# Patient Record
Sex: FEMALE | Race: WHITE | NOT HISPANIC OR LATINO | Employment: FULL TIME | URBAN - METROPOLITAN AREA
[De-identification: names, ages, dates, MRNs, and addresses within clinical notes are randomized per-mention and may not be internally consistent; named-entity substitution may affect disease eponyms.]

---

## 2019-07-18 ENCOUNTER — TRANSCRIBE ORDERS (OUTPATIENT)
Dept: ADMINISTRATIVE | Facility: HOSPITAL | Age: 44
End: 2019-07-18

## 2019-07-18 DIAGNOSIS — Z12.31 VISIT FOR SCREENING MAMMOGRAM: Primary | ICD-10-CM

## 2019-08-27 ENCOUNTER — APPOINTMENT (OUTPATIENT)
Dept: MAMMOGRAPHY | Facility: HOSPITAL | Age: 44
End: 2019-08-27

## 2019-10-08 ENCOUNTER — HOSPITAL ENCOUNTER (OUTPATIENT)
Dept: MAMMOGRAPHY | Facility: HOSPITAL | Age: 44
Discharge: HOME | End: 2019-10-08
Admitting: OBSTETRICS & GYNECOLOGY

## 2019-10-08 DIAGNOSIS — Z12.31 VISIT FOR SCREENING MAMMOGRAM: ICD-10-CM

## 2019-10-08 PROCEDURE — 77063 BREAST TOMOSYNTHESIS BI: CPT

## 2019-10-08 PROCEDURE — 77067 SCR MAMMO BI INCL CAD: CPT | Performed by: RADIOLOGY

## 2019-10-08 PROCEDURE — 77067 SCR MAMMO BI INCL CAD: CPT

## 2019-10-08 PROCEDURE — 77063 BREAST TOMOSYNTHESIS BI: CPT | Performed by: RADIOLOGY

## 2019-11-12 ENCOUNTER — APPOINTMENT (OUTPATIENT)
Dept: MAMMOGRAPHY | Facility: HOSPITAL | Age: 44
End: 2019-11-12

## 2019-12-30 ENCOUNTER — HOSPITAL ENCOUNTER (OUTPATIENT)
Dept: MAMMOGRAPHY | Facility: HOSPITAL | Age: 44
Discharge: HOME | End: 2019-12-30
Admitting: RADIOLOGY

## 2019-12-30 ENCOUNTER — HOSPITAL ENCOUNTER (OUTPATIENT)
Dept: ULTRASOUND IMAGING | Facility: HOSPITAL | Age: 44
Discharge: HOME | End: 2019-12-30

## 2019-12-30 DIAGNOSIS — R92.8 ABNORMAL MAMMOGRAM: ICD-10-CM

## 2019-12-30 PROCEDURE — 77061 BREAST TOMOSYNTHESIS UNI: CPT | Performed by: RADIOLOGY

## 2019-12-30 PROCEDURE — 77065 DX MAMMO INCL CAD UNI: CPT | Performed by: RADIOLOGY

## 2019-12-30 PROCEDURE — 76642 ULTRASOUND BREAST LIMITED: CPT

## 2019-12-30 PROCEDURE — G0279 TOMOSYNTHESIS, MAMMO: HCPCS

## 2019-12-30 PROCEDURE — 77065 DX MAMMO INCL CAD UNI: CPT

## 2019-12-30 PROCEDURE — 76642 ULTRASOUND BREAST LIMITED: CPT | Performed by: RADIOLOGY

## 2024-02-27 ENCOUNTER — OFFICE VISIT (OUTPATIENT)
Dept: OBSTETRICS AND GYNECOLOGY | Facility: CLINIC | Age: 49
End: 2024-02-27
Payer: COMMERCIAL

## 2024-02-27 VITALS
SYSTOLIC BLOOD PRESSURE: 126 MMHG | BODY MASS INDEX: 32.74 KG/M2 | WEIGHT: 216 LBS | HEIGHT: 68 IN | DIASTOLIC BLOOD PRESSURE: 84 MMHG | RESPIRATION RATE: 18 BRPM

## 2024-02-27 DIAGNOSIS — Z01.419 PAP TEST, AS PART OF ROUTINE GYNECOLOGICAL EXAMINATION: Primary | ICD-10-CM

## 2024-02-27 RX ORDER — NORETHINDRONE ACETATE AND ETHINYL ESTRADIOL .02; 1 MG/1; MG/1
1 TABLET ORAL DAILY
Qty: 84 TABLET | Refills: 3 | Status: SHIPPED | OUTPATIENT
Start: 2024-02-27

## 2024-02-27 NOTE — PROGRESS NOTES
Gynecologic Annual Exam Note          GYN Annual Exam     Gynecologic Exam        Subjective     HPI  Shaylee Nicholas is a 48 y.o. female, , who presents for annual well woman exam as a previous patient not seen in the last three years. There were no changes to her medical or surgical history since her last visit..  Patient's last menstrual period was 2023 (within days).  Her periods occur every 2-3 months, lasting 10  days.  The flow is spotting/light. She denies dysmenorrhea. Marital Status: . She is sexually active. She has not had new partners.. STD testing recommendations have been explained to the patient and she declines STD testing.    The patient would like to discuss the following complaints today: patient would like birth control.    Additional OB/GYN History   contraceptive methods: Withdrawal   Desires to: start contraception  History of migraines: no    Last Pap : over 3 years ago. Result: negative. HPV: unknown .   Last Completed Pap Smear       This patient has no relevant Health Maintenance data.          History of abnormal Pap smear: no  Family history of uterine, colon, breast, or ovarian cancer: yes - mother had ovarian cancer  Performs monthly Self-Breast Exam: yes  Last mammogram: over 3 years ago.    Last Completed Mammogram       This patient has no relevant Health Maintenance data.            Colonoscopy: has had a colonoscopy 8 years ago  Exercises Regularly: no  Feelings of Anxiety or Depression: no  Tobacco Usage?: No       Current Outpatient Medications:   •  norethindrone-ethinyl estradiol (Loestrin , ,) 1-20 MG-MCG per tablet, Take 1 tablet by mouth Daily., Disp: 84 tablet, Rfl: 3     Patient denies the need for medication refills today.    OB History          2    Para   2    Term   2            AB        Living   2         SAB        IAB        Ectopic        Molar        Multiple        Live Births   2                No past medical  "history on file.     Past Surgical History:   Procedure Laterality Date   • WISDOM TOOTH EXTRACTION      1994       Health Maintenance   Topic Date Due   • Annual Gynecologic Pelvic and Breast Exam  Never done   • BMI FOLLOWUP  Never done   • COLORECTAL CANCER SCREENING  Never done   • TDAP/TD VACCINES (1 - Tdap) Never done   • INFLUENZA VACCINE  Never done   • COVID-19 Vaccine (3 - 2023-24 season) 09/01/2023   • HEPATITIS C SCREENING  Never done   • ANNUAL PHYSICAL  Never done   • PAP SMEAR  Never done   • Pneumococcal Vaccine 0-64  Aged Out       The additional following portions of the patient's history were reviewed and updated as appropriate: allergies and current medications.    Review of Systems      I have reviewed and agree with the HPI, ROS, and historical information as entered above. Frank De La Garza MD          Objective   /84   Resp 18   Ht 172.7 cm (68\")   Wt 98 kg (216 lb)   LMP 11/27/2023 (Within Days)   BMI 32.84 kg/m²     Physical Exam  Vitals and nursing note reviewed. Exam conducted with a chaperone present.   Constitutional:       Appearance: She is well-developed.   HENT:      Head: Normocephalic and atraumatic.   Neck:      Thyroid: No thyroid mass or thyromegaly.   Cardiovascular:      Rate and Rhythm: Normal rate and regular rhythm.      Heart sounds: No murmur heard.  Pulmonary:      Effort: Pulmonary effort is normal. No retractions.      Breath sounds: Normal breath sounds. No wheezing, rhonchi or rales.   Chest:      Chest wall: No mass or tenderness.   Breasts:     Right: Normal. No mass, nipple discharge, skin change or tenderness.      Left: Normal. No mass, nipple discharge, skin change or tenderness.   Abdominal:      General: Bowel sounds are normal.      Palpations: Abdomen is soft. Abdomen is not rigid. There is no mass.      Tenderness: There is no abdominal tenderness. There is no guarding.      Hernia: No hernia is present. There is no hernia in the left inguinal " area.   Genitourinary:     Labia:         Right: No rash, tenderness or lesion.         Left: No rash, tenderness or lesion.       Vagina: Normal. No vaginal discharge or lesions.      Cervix: No cervical motion tenderness, discharge, lesion or cervical bleeding.      Uterus: Normal. Not enlarged, not fixed and not tender.       Adnexa:         Right: No mass or tenderness.          Left: No mass or tenderness.        Rectum: No external hemorrhoid.   Musculoskeletal:      Cervical back: Normal range of motion. No muscular tenderness.   Neurological:      Mental Status: She is alert and oriented to person, place, and time.   Psychiatric:         Behavior: Behavior normal.            Assessment and Plan    Problem List Items Addressed This Visit    None  Visit Diagnoses       Pap test, as part of routine gynecological examination    -  Primary    Relevant Medications    norethindrone-ethinyl estradiol (Loestrin 1/20, 21,) 1-20 MG-MCG per tablet    Other Relevant Orders    LIQUID-BASED PAP SMEAR WITH HPV GENOTYPING REGARDLESS OF INTERPRETATION ([facility],MAD)    Mammo Screening Digital Tomosynthesis Bilateral With CAD    Ambulatory Referral to Colorectal Surgery (Completed)            GYN annual well woman exam.   Pap guidelines reviewed.  OCP's/Vaginal Ring - Discussed side effects of nausea, BTB, headaches, breast tenderness and slight weight gain in the first three cycles.  Understands risks of blood clots, stroke, and theoretical risk of breast cancer.  Denies family history of blood clots.  Reviewed risks/benefits of hormonal contraception after age 35, including possible increased risk of breast cancer, heart disease, blood clots and strokes.  Patient strongly desires to stay on hormonal contraception.  Reviewed monthly self breast exams.  Instructed to call with lumps, pain, or breast discharge.    Ordered Mammogram today  Recommended use of Vitamin D replacement and getting adequate calcium in her diet.  (1500mg)  Reviewed BMI and weight loss as preventative health measures.   Reviewed exercise as a preventative health measures.   RTC in 1 year or PRN with problems.  Discussed importance of routine colon cancer screening. Reviewed current guidelines, for patients at average risk for colon cancer, discussed cologuard as an acceptable alternative.   No follow-ups on file.     Frank De La Garza MD  02/27/2024

## 2024-03-01 ENCOUNTER — TELEPHONE (OUTPATIENT)
Dept: OBSTETRICS AND GYNECOLOGY | Facility: CLINIC | Age: 49
End: 2024-03-01
Payer: COMMERCIAL

## 2024-03-01 DIAGNOSIS — Z12.31 BREAST CANCER SCREENING BY MAMMOGRAM: ICD-10-CM

## 2024-03-01 DIAGNOSIS — R92.8 ABNORMAL MAMMOGRAM: Primary | ICD-10-CM

## 2024-03-01 NOTE — TELEPHONE ENCOUNTER
Screening mammogram routed back: BASED ON LAST MAMMOGRAM, PATIENT WILL NEED A DIAGNOSTIC MAMMOGRAM INSTEAD OF SCREENING.     Please place new order for diagnostic mammogram

## 2024-03-04 LAB — REF LAB TEST METHOD: NORMAL

## 2024-09-09 ENCOUNTER — OFFICE VISIT (OUTPATIENT)
Dept: OBSTETRICS AND GYNECOLOGY | Facility: CLINIC | Age: 49
End: 2024-09-09
Payer: COMMERCIAL

## 2024-09-09 VITALS — BODY MASS INDEX: 31.02 KG/M2 | DIASTOLIC BLOOD PRESSURE: 84 MMHG | SYSTOLIC BLOOD PRESSURE: 122 MMHG | WEIGHT: 204 LBS

## 2024-09-09 DIAGNOSIS — D25.0 SUBMUCOUS LEIOMYOMA OF UTERUS: ICD-10-CM

## 2024-09-09 DIAGNOSIS — N92.0 MENORRHAGIA WITH REGULAR CYCLE: Primary | ICD-10-CM

## 2024-09-09 PROCEDURE — 99213 OFFICE O/P EST LOW 20 MIN: CPT | Performed by: NURSE PRACTITIONER

## 2024-09-09 RX ORDER — NORETHINDRONE ACETATE AND ETHINYL ESTRADIOL .03; 1.5 MG/1; MG/1
1 TABLET ORAL DAILY
Qty: 84 EACH | Refills: 3 | Status: SHIPPED | OUTPATIENT
Start: 2024-09-09

## 2024-09-09 NOTE — PROGRESS NOTES
Chief Complaint   Patient presents with    Menorrhagia         Subjective   HPI  Shaylee Nicholas is a 49 y.o. female, , Patient's last menstrual period was 2024 (exact date).. She presents for evaluation of menorrhagia with regular cycles. She saturates 1 tampon(s)/pad(s) every 2 hours for 5 days.  On her heaviest days she was passing clots. She was prescribed ocp in February of this year for heavy bleeding. She took this for three months but did not like she felt on it. She reports  and July her periods were better, but by August she had started the heavy bleeding again. She reports she has been under more stress than usual. She went to PCP and was told she is anemic, she has been lethargic, heart palpitations, and lightheaded. She has been on B12 and Iron supplements for 2 weeks now.  In the past the patient has tried birth control pills/Nuvaring in February of this year (3 months) for treatment without success. The patient reports additional symptoms as none.      US was done today. Fibroids. 1) 26.6 x 20.7 x 20.8 mm submucosal vs intramural. 2) 38.3 x 25.1 x 28.4 mm, submucosal. Unable to clearly visualize endometrial borders due to fibroids. Normal ovaries.    Thromboembolic Disease: none  History of hypertension: no  History of migraines: no  Tobacco Usage?: No     Additional OB/GYN History   Last Pap :   Last Completed Pap Smear            PAP SMEAR (Every 3 Years) Next due on 2024  LIQUID-BASED PAP SMEAR WITH HPV GENOTYPING REGARDLESS OF INTERPRETATION ([facility],MAD)                      Current Outpatient Medications:     Norethindrone Acet-Ethinyl Est (Junel 1.5/30) 1.5-30 MG-MCG tablet, Take 1 tablet by mouth Daily., Disp: 84 each, Rfl: 3     History reviewed. No pertinent past medical history.     Past Surgical History:   Procedure Laterality Date    WISDOM TOOTH EXTRACTION               The additional following portions of the patient's history were  reviewed and updated as appropriate: allergies and current medications.    Review of Systems   Constitutional: Negative.    Respiratory: Negative.     Cardiovascular: Negative.    Gastrointestinal: Negative.    Genitourinary:  Positive for menstrual problem and vaginal bleeding.   Psychiatric/Behavioral: Negative.         I have reviewed and agree with the HPI, ROS, and historical information as entered above. Gricelda Braswellhip, APRN      Objective   /84   Wt 92.5 kg (204 lb)   LMP 08/30/2024 (Exact Date)   BMI 31.02 kg/m²     Physical Exam  Vitals and nursing note reviewed.   Constitutional:       Appearance: Normal appearance. She is well-developed.   HENT:      Head: Normocephalic and atraumatic.   Cardiovascular:      Rate and Rhythm: Normal rate and regular rhythm.   Pulmonary:      Effort: Pulmonary effort is normal.      Breath sounds: Normal breath sounds.   Abdominal:      Palpations: Abdomen is soft. Abdomen is not rigid.   Musculoskeletal:      Cervical back: Normal range of motion.   Neurological:      Mental Status: She is alert and oriented to person, place, and time.   Psychiatric:         Behavior: Behavior normal.         Assessment & Plan     Assessment     Problem List Items Addressed This Visit    None  Visit Diagnoses       Menorrhagia with regular cycle    -  Primary    Relevant Medications    Norethindrone Acet-Ethinyl Est (Junel 1.5/30) 1.5-30 MG-MCG tablet    Other Relevant Orders    US Non-ob Transvaginal    Submucous leiomyoma of uterus        Relevant Medications    Norethindrone Acet-Ethinyl Est (Junel 1.5/30) 1.5-30 MG-MCG tablet    Other Relevant Orders    US Non-ob Transvaginal              Plan     Discussed options both medical and surgical.  Discussed potential etiologies and treatment options.   Uterine leiomyomata - discussed options for management of AUB including OCPs, IUD and cyclic progesterone versus definitive therapy. Understands 1/1000 risk of  leiomyosarcoma.  Junel 1.5/30 Rx. She will use this for 3 months and FU with US to monitor fibroids.      Gricelda Washington, APRN  09/09/2024

## 2024-12-09 ENCOUNTER — OFFICE VISIT (OUTPATIENT)
Dept: OBSTETRICS AND GYNECOLOGY | Facility: CLINIC | Age: 49
End: 2024-12-09
Payer: COMMERCIAL

## 2024-12-09 VITALS
WEIGHT: 205 LBS | DIASTOLIC BLOOD PRESSURE: 80 MMHG | HEIGHT: 68 IN | SYSTOLIC BLOOD PRESSURE: 138 MMHG | BODY MASS INDEX: 31.07 KG/M2

## 2024-12-09 DIAGNOSIS — D50.9 IRON DEFICIENCY ANEMIA, UNSPECIFIED IRON DEFICIENCY ANEMIA TYPE: ICD-10-CM

## 2024-12-09 DIAGNOSIS — N93.9 ABNORMAL UTERINE BLEEDING (AUB): ICD-10-CM

## 2024-12-09 DIAGNOSIS — D25.0 SUBMUCOUS LEIOMYOMA OF UTERUS: Primary | ICD-10-CM

## 2024-12-09 PROCEDURE — 99213 OFFICE O/P EST LOW 20 MIN: CPT | Performed by: NURSE PRACTITIONER

## 2024-12-09 RX ORDER — RELUGOLIX, ESTRADIOL HEMIHYDRATE, AND NORETHINDRONE ACETATE 40; 1; .5 MG/1; MG/1; MG/1
1 TABLET, FILM COATED ORAL DAILY
Qty: 30 TABLET | Refills: 11 | Status: SHIPPED | OUTPATIENT
Start: 2024-12-09

## 2024-12-09 RX ORDER — FERRIC MALTOL 30 MG/1
1 CAPSULE ORAL 2 TIMES DAILY
Qty: 60 CAPSULE | Refills: 6 | Status: SHIPPED | OUTPATIENT
Start: 2024-12-09

## 2024-12-09 NOTE — PROGRESS NOTES
Chief Complaint   Patient presents with    Follow-up       Subjective   HPI  Shaylee Nicholas is a 49 y.o. female, . Her last LMP was Patient's last menstrual period was 10/09/2024 (approximate). who presents for follow up on fibroids.      At her last visit she was treated with Junel  Rx . Since then she reports her symptoms/issue has worsened. States that clotting and heaviness has decreased but she has been on her period for two months. States that dysmenorrhea has increased. She was anemic already in August. She states that the bleeding has made her feel debilitated.       Additional OB/GYN History     Last Pap : 24  Last Completed Pap Smear            PAP SMEAR (Every 3 Years) Next due on 2024  LIQUID-BASED PAP SMEAR WITH HPV GENOTYPING REGARDLESS OF INTERPRETATION ([facility],COR,MAD)                    Last mammogram:   Last Completed Mammogram       This patient has no relevant Health Maintenance data.            Tobacco Usage?: No   OB History          2    Para   2    Term   2            AB        Living   2         SAB        IAB        Ectopic        Molar        Multiple        Live Births   2                  Current Outpatient Medications:     Ferric Maltol (ACCRUFeR) 30 MG capsule, Take 1 capsule by mouth 2 (Two) Times a Day., Disp: 60 capsule, Rfl: 6    Relugolix-Estradiol-Norethind (Myfembree) 40-1-0.5 MG tablet, Take 1 tablet by mouth Daily., Disp: 30 tablet, Rfl: 11     History reviewed. No pertinent past medical history.     Past Surgical History:   Procedure Laterality Date    WISDOM TOOTH EXTRACTION             The additional following portions of the patient's history were reviewed and updated as appropriate: allergies, current medications, past family history, past medical history, past social history, past surgical history, and problem list.    Review of Systems   Constitutional: Negative.    HENT: Negative.     Eyes:  "Negative.    Respiratory: Negative.     Cardiovascular: Negative.    Gastrointestinal: Negative.    Endocrine: Negative.    Genitourinary:  Positive for menstrual problem (2 months of bleeding, cramping).        Fibroids     Musculoskeletal: Negative.    Skin: Negative.    Allergic/Immunologic: Negative.    Neurological: Negative.    Hematological: Negative.    Psychiatric/Behavioral: Negative.         I have reviewed and agree with the HPI, ROS, and historical information as entered above. Gricelda Braswellhip, APRN      Objective   /80   Ht 172.7 cm (68\")   Wt 93 kg (205 lb)   LMP 10/09/2024 (Approximate)   Breastfeeding No   BMI 31.17 kg/m²     Physical Exam  Vitals and nursing note reviewed.   Constitutional:       Appearance: Normal appearance. She is well-developed.   HENT:      Head: Normocephalic and atraumatic.   Cardiovascular:      Rate and Rhythm: Normal rate and regular rhythm.   Pulmonary:      Effort: Pulmonary effort is normal.      Breath sounds: Normal breath sounds.   Abdominal:      Palpations: Abdomen is soft. Abdomen is not rigid.   Musculoskeletal:      Cervical back: Normal range of motion.   Neurological:      Mental Status: She is alert and oriented to person, place, and time.   Psychiatric:         Behavior: Behavior normal.         Assessment & Plan     Assessment     Problem List Items Addressed This Visit    None  Visit Diagnoses       Submucous leiomyoma of uterus    -  Primary    Relevant Medications    Relugolix-Estradiol-Norethind (Myfembree) 40-1-0.5 MG tablet    Abnormal uterine bleeding (AUB)        Relevant Medications    Relugolix-Estradiol-Norethind (Myfembree) 40-1-0.5 MG tablet    Ferric Maltol (ACCRUFeR) 30 MG capsule    Iron deficiency anemia, unspecified iron deficiency anemia type        Relevant Medications    Ferric Maltol (ACCRUFeR) 30 MG capsule              Plan     US shows one uterine fibroid slightly larger than at last scan, measuring 57 x 29.7 x " 33.2 mm. No cysts or polyps. EMC unable to be evaluated due to fibroid.  Shaylee has had some success with the pill in decreasing the amount of blood flow, however, she has had daily light bleeding for the last two months and reports an increase in daily cramping. Discussed other BC options, IUD or Myfembree. She would like to try myfembree for 1-2 months and if she likes it may continue. She may also decide to get Mirena IUD if she does not like myfembree. Rx for myfembree sent to [facility] and samples provided to get started. SE/AE reviewed. She will call back if she decides to get the IUD.  Anemia: has been taking otc iron which causes constipation and GI upset. Change to accrufer and Rx sent.  Return if symptoms worsen or fail to improve.        Gricelda Washington, APRN  12/09/2024

## 2024-12-16 ENCOUNTER — TELEPHONE (OUTPATIENT)
Dept: OBSTETRICS AND GYNECOLOGY | Facility: CLINIC | Age: 49
End: 2024-12-16
Payer: COMMERCIAL

## 2024-12-16 NOTE — TELEPHONE ENCOUNTER
PA's for Accrufer and Myfembree received from [email or web address]. Accrufer should go to [facility] (Key: [id number])  Rx #: [id number] Drug  ACCRUFeR 30MG capsules [facility] Electronic Prior Authorization Anemia: has been taking otc iron which causes constipation and GI upset. Change to accrufer and Rx sent. Pending for both meds

## 2024-12-16 NOTE — TELEPHONE ENCOUNTER
Update on the two Prior auths: Outcome  Approved today by [employer]  Request Reference Number: [id number]. MYFEMBREE TAB is approved through 12/16/2025. Your patient may now fill this prescription and it will be covered.  Authorization Expiration Date: 12/16/2025    I called [facility] and notified them and the can dispense the ACCRUFER

## 2025-05-12 ENCOUNTER — TELEPHONE (OUTPATIENT)
Dept: OBSTETRICS AND GYNECOLOGY | Facility: CLINIC | Age: 50
End: 2025-05-12
Payer: COMMERCIAL

## 2025-05-12 DIAGNOSIS — N92.0 MENORRHAGIA WITH REGULAR CYCLE: Primary | ICD-10-CM

## 2025-05-12 RX ORDER — NORETHINDRONE ACETATE AND ETHINYL ESTRADIOL .03; 1.5 MG/1; MG/1
1 TABLET ORAL DAILY
Qty: 84 TABLET | Refills: 1 | Status: SHIPPED | OUTPATIENT
Start: 2025-05-12

## 2025-05-12 NOTE — TELEPHONE ENCOUNTER
PT CALLING SHE STATES HER PHARMACY IS HAVING DIFFICULTY FILLING HER BIRTH CONTROL PRESCRIPTION ON MYFEMBREE, PHARMACY STATES THEY ARE UNABLE TO RECEIVE THE MYFEMBREE   PATIENT IS REQUESTING THE OLD BIRTH CONTROL SHE WAS PRESCRIBED IN [place] TO BE CALLED IN TO [facility] IN [place]   PATIENT STATES THAT ONE WORKS WELL FOR HER

## 2025-05-12 NOTE — TELEPHONE ENCOUNTER
S/w pt she states tonny filled the myfembree the first time for her but said for the future she will need to get this filled at [facility] pharmacy. Patient states the second month she had to drive to [place] to get the medication and now that she needs a refill for her 3rd months, [facility] is telling her that they are not able to get myfembree in from the  and told patient to call our office.     Patient states she would like to go back on the previous OCP she was on before (junel), I offered the patient samples of myfembree until we can ask the drug rep what was going on with this and she states it is okay, she just wants to switch.

## 2025-08-08 ENCOUNTER — TELEPHONE (OUTPATIENT)
Dept: OBSTETRICS AND GYNECOLOGY | Facility: CLINIC | Age: 50
End: 2025-08-08
Payer: COMMERCIAL